# Patient Record
Sex: FEMALE | Race: WHITE | NOT HISPANIC OR LATINO | Employment: OTHER | URBAN - NONMETROPOLITAN AREA
[De-identification: names, ages, dates, MRNs, and addresses within clinical notes are randomized per-mention and may not be internally consistent; named-entity substitution may affect disease eponyms.]

---

## 2024-01-04 DIAGNOSIS — I10 ESSENTIAL HYPERTENSION: ICD-10-CM

## 2024-01-04 PROBLEM — R07.89 CHEST DISCOMFORT: Status: ACTIVE | Noted: 2024-01-04

## 2024-01-04 PROBLEM — R93.1 ECHOCARDIOGRAM ABNORMAL: Status: ACTIVE | Noted: 2024-01-04

## 2024-01-04 RX ORDER — ASPIRIN 81 MG/1
1 TABLET ORAL DAILY
COMMUNITY

## 2024-01-04 RX ORDER — AMLODIPINE BESYLATE 10 MG/1
10 TABLET ORAL DAILY
Qty: 90 TABLET | Refills: 3 | Status: SHIPPED | OUTPATIENT
Start: 2024-01-04

## 2024-01-04 RX ORDER — ATORVASTATIN CALCIUM 40 MG/1
40 TABLET, FILM COATED ORAL DAILY
COMMUNITY
Start: 2023-03-23

## 2024-01-04 RX ORDER — LOSARTAN POTASSIUM 50 MG/1
1 TABLET ORAL DAILY
COMMUNITY
Start: 2022-07-21

## 2024-01-04 RX ORDER — PANTOPRAZOLE SODIUM 40 MG/1
1 TABLET, DELAYED RELEASE ORAL 2 TIMES DAILY
COMMUNITY

## 2024-01-04 RX ORDER — SUCRALFATE 1 G/1
1 TABLET ORAL 2 TIMES DAILY
COMMUNITY

## 2024-01-04 RX ORDER — AMLODIPINE BESYLATE 10 MG/1
1 TABLET ORAL DAILY
COMMUNITY
End: 2024-04-10 | Stop reason: WASHOUT

## 2024-03-05 PROBLEM — Z78.9 NEVER SMOKED CIGARETTES: Status: ACTIVE | Noted: 2024-03-05

## 2024-03-05 PROBLEM — E78.5 HYPERLIPEMIA: Status: ACTIVE | Noted: 2024-03-05

## 2024-03-05 PROBLEM — R09.89 CAROTID BRUIT: Status: ACTIVE | Noted: 2024-03-05

## 2024-04-10 ENCOUNTER — OFFICE VISIT (OUTPATIENT)
Dept: CARDIOLOGY | Facility: CLINIC | Age: 89
End: 2024-04-10
Payer: MEDICARE

## 2024-04-10 VITALS
SYSTOLIC BLOOD PRESSURE: 148 MMHG | HEART RATE: 76 BPM | WEIGHT: 118 LBS | BODY MASS INDEX: 23.16 KG/M2 | HEIGHT: 60 IN | DIASTOLIC BLOOD PRESSURE: 68 MMHG

## 2024-04-10 DIAGNOSIS — R09.89 BRUIT OF LEFT CAROTID ARTERY: ICD-10-CM

## 2024-04-10 DIAGNOSIS — E78.2 MIXED HYPERLIPIDEMIA: ICD-10-CM

## 2024-04-10 DIAGNOSIS — I10 ESSENTIAL HYPERTENSION: Primary | ICD-10-CM

## 2024-04-10 PROCEDURE — 99214 OFFICE O/P EST MOD 30 MIN: CPT | Performed by: INTERNAL MEDICINE

## 2024-04-10 PROCEDURE — 1036F TOBACCO NON-USER: CPT | Performed by: INTERNAL MEDICINE

## 2024-04-10 PROCEDURE — 3077F SYST BP >= 140 MM HG: CPT | Performed by: INTERNAL MEDICINE

## 2024-04-10 PROCEDURE — 1159F MED LIST DOCD IN RCRD: CPT | Performed by: INTERNAL MEDICINE

## 2024-04-10 PROCEDURE — 3078F DIAST BP <80 MM HG: CPT | Performed by: INTERNAL MEDICINE

## 2024-04-10 RX ORDER — OXYBUTYNIN CHLORIDE 5 MG/1
5 TABLET, EXTENDED RELEASE ORAL DAILY
COMMUNITY
Start: 2024-01-13

## 2024-04-10 RX ORDER — MEMANTINE HYDROCHLORIDE 5 MG/1
5 TABLET ORAL DAILY
COMMUNITY
Start: 2024-01-13

## 2024-04-10 RX ORDER — MULTIVITAMIN
1 TABLET ORAL
COMMUNITY
Start: 2011-05-05

## 2024-04-10 NOTE — LETTER
April 10, 2024     Gerald Webb DO  [address] [facility] [address]  [facility] [address]    Patient: Mala Bernard   YOB: 1933   Date of Visit: 4/10/2024       Dear Dr. Gerald Webb DO:    Thank you for referring Mala Bernard to me for evaluation. Below are my notes for this consultation.  If you have questions, please do not hesitate to call me. I look forward to following your patient along with you.       Sincerely,     Sebas Zaidi MD      CC: No Recipients  ______________________________________________________________________________________    Subjective   Mala Bernard is a 89 y.o. female       Chief Complaint    Follow-up          HPI     Patient returns in follow-up of problems as noted.  She is done well.  I cannot elicit any angina CHF or arrhythmia symptomatology.  Blood pressure and lipids are adequately treated and are controlled.  Because of this no adjustments in therapy are made.  The patient states that from time to time she has chest heaviness.  Detailed questioning ensued.  She is physically active around the house and actually goes to the grocery store and pushes the cart.  With activity she has no discomfort but on rare occasion she will complain of chest heaviness at rest.  Symptoms in this regard do not sound anginal.  If in fact it was CAD I would expect symptoms with exertion and not at rest.  Because of this she and her son were provided education and reassurance.    In regards to her bruit it was evaluated last year and felt to be mild.  Because of this blood pressure control statin therapy and aspirin are provided and she has done well in this regard.    She and her son were educated regarding cardiac and neurologic symptoms to watch for and encouraged to call if they arise.    Vitals:    04/10/24 0904   BP: 148/68   BP Location: Left arm   Patient Position: Sitting   Pulse: 76   Weight: 53.5 kg (118 lb)   Height: 1.524 m (5')        Objective    Physical Exam  Constitutional:       Appearance: Normal appearance.   HENT:      Nose: Nose normal.   Neck:      Vascular: No carotid bruit.   Cardiovascular:      Rate and Rhythm: Normal rate.      Pulses: Normal pulses.      Heart sounds: Normal heart sounds.   Pulmonary:      Effort: Pulmonary effort is normal.   Abdominal:      General: Bowel sounds are normal.      Palpations: Abdomen is soft.   Musculoskeletal:         General: Normal range of motion.      Cervical back: Normal range of motion.      Right lower leg: No edema.      Left lower leg: No edema.   Skin:     General: Skin is warm and dry.   Neurological:      General: No focal deficit present.      Mental Status: She is alert.   Psychiatric:         Mood and Affect: Mood normal.         Behavior: Behavior normal.         Thought Content: Thought content normal.         Judgment: Judgment normal.         Allergies  Patient has no known allergies.     Current Medications    Current Outpatient Medications:   •  amLODIPine (Norvasc) 10 mg tablet, TAKE 1 TABLET BY MOUTH DAILY, Disp: 90 tablet, Rfl: 3  •  aspirin 81 mg EC tablet, Take 1 tablet (81 mg) by mouth once daily., Disp: , Rfl:   •  atorvastatin (Lipitor) 40 mg tablet, Take 1 tablet (40 mg) by mouth once daily., Disp: , Rfl:   •  losartan (Cozaar) 50 mg tablet, Take 1 tablet (50 mg) by mouth once daily., Disp: , Rfl:   •  memantine (Namenda) 5 mg tablet, Take 1 tablet (5 mg) by mouth once daily., Disp: , Rfl:   •  multivitamin tablet, Take 1 tablet by mouth once daily., Disp: , Rfl:   •  oxybutynin XL (Ditropan-XL) 5 mg 24 hr tablet, Take 1 tablet (5 mg) by mouth once daily., Disp: , Rfl:   •  pantoprazole (ProtoNix) 40 mg EC tablet, Take 1 tablet (40 mg) by mouth 2 times a day., Disp: , Rfl:   •  sucralfate (Carafate) 1 gram tablet, Take 1 tablet (1 g) by mouth 2 times a day., Disp: , Rfl:                      Assessment/Plan   1. Essential hypertension  Adequate control for someone 89 years  old.  No adjustments are recommended    2. Mixed hyperlipidemia  Patient is on Lipitor and review of recent labs demonstrate satisfactory control.    3. Bruit of left carotid artery  Carotid bruits felt to represent only mild disease based upon recent ultrasound.  No intervention necessary at this time.          Scribe Attestation  By signing my name below, I, Tima Montenegro LPN   attest that this documentation has been prepared under the direction and in the presence of Sebas Zaidi MD.     Provider Attestation - Scribe documentation    All medical record entries made by the Scribe were at my direction and personally dictated by me. I have reviewed the chart and agree that the record accurately reflects my personal performance of the history, physical exam, discussion and plan.

## 2024-04-10 NOTE — PATIENT INSTRUCTIONS
Please bring all medicines, vitamins, and herbal supplements with you when you come to the office.    Prescriptions will not be filled unless you are compliant with your follow up appointments or have a follow up appointment scheduled as per instruction of your physician. Refills should be requested at the time of your visit.     BMI was above normal measurement. Current weight: 53.5 kg (118 lb)  Weight change since last visit (-) denotes wt loss 10 lbs   Weight loss needed to achieve BMI 25: -9.7 Lbs  Weight loss needed to achieve BMI 30: -35.3 Lbs  Provided instructions on dietary changes  Provided instructions on exercise  Advised to Increase physical activity.

## 2024-04-10 NOTE — PROGRESS NOTES
Subjective   Mala Bernard is a 89 y.o. female       Chief Complaint    Follow-up          HPI     Patient returns in follow-up of problems as noted.  She is done well.  I cannot elicit any angina CHF or arrhythmia symptomatology.  Blood pressure and lipids are adequately treated and are controlled.  Because of this no adjustments in therapy are made.  The patient states that from time to time she has chest heaviness.  Detailed questioning ensued.  She is physically active around the house and actually goes to the grocery store and pushes the cart.  With activity she has no discomfort but on rare occasion she will complain of chest heaviness at rest.  Symptoms in this regard do not sound anginal.  If in fact it was CAD I would expect symptoms with exertion and not at rest.  Because of this she and her son were provided education and reassurance.    In regards to her bruit it was evaluated last year and felt to be mild.  Because of this blood pressure control statin therapy and aspirin are provided and she has done well in this regard.    She and her son were educated regarding cardiac and neurologic symptoms to watch for and encouraged to call if they arise.    Vitals:    04/10/24 0904   BP: 148/68   BP Location: Left arm   Patient Position: Sitting   Pulse: 76   Weight: 53.5 kg (118 lb)   Height: 1.524 m (5')        Objective   Physical Exam  Constitutional:       Appearance: Normal appearance.   HENT:      Nose: Nose normal.   Neck:      Vascular: No carotid bruit.   Cardiovascular:      Rate and Rhythm: Normal rate.      Pulses: Normal pulses.      Heart sounds: Normal heart sounds.   Pulmonary:      Effort: Pulmonary effort is normal.   Abdominal:      General: Bowel sounds are normal.      Palpations: Abdomen is soft.   Musculoskeletal:         General: Normal range of motion.      Cervical back: Normal range of motion.      Right lower leg: No edema.      Left lower leg: No edema.   Skin:     General:  Skin is warm and dry.   Neurological:      General: No focal deficit present.      Mental Status: She is alert.   Psychiatric:         Mood and Affect: Mood normal.         Behavior: Behavior normal.         Thought Content: Thought content normal.         Judgment: Judgment normal.         Allergies  Patient has no known allergies.     Current Medications    Current Outpatient Medications:     amLODIPine (Norvasc) 10 mg tablet, TAKE 1 TABLET BY MOUTH DAILY, Disp: 90 tablet, Rfl: 3    aspirin 81 mg EC tablet, Take 1 tablet (81 mg) by mouth once daily., Disp: , Rfl:     atorvastatin (Lipitor) 40 mg tablet, Take 1 tablet (40 mg) by mouth once daily., Disp: , Rfl:     losartan (Cozaar) 50 mg tablet, Take 1 tablet (50 mg) by mouth once daily., Disp: , Rfl:     memantine (Namenda) 5 mg tablet, Take 1 tablet (5 mg) by mouth once daily., Disp: , Rfl:     multivitamin tablet, Take 1 tablet by mouth once daily., Disp: , Rfl:     oxybutynin XL (Ditropan-XL) 5 mg 24 hr tablet, Take 1 tablet (5 mg) by mouth once daily., Disp: , Rfl:     pantoprazole (ProtoNix) 40 mg EC tablet, Take 1 tablet (40 mg) by mouth 2 times a day., Disp: , Rfl:     sucralfate (Carafate) 1 gram tablet, Take 1 tablet (1 g) by mouth 2 times a day., Disp: , Rfl:                      Assessment/Plan   1. Essential hypertension  Adequate control for someone 89 years old.  No adjustments are recommended    2. Mixed hyperlipidemia  Patient is on Lipitor and review of recent labs demonstrate satisfactory control.    3. Bruit of left carotid artery  Carotid bruits felt to represent only mild disease based upon recent ultrasound.  No intervention necessary at this time.          Scribe Attestation  By signing my name below, I, Denia HOLMAN LPN  , [place]   attest that this documentation has been prepared under the direction and in the presence of Sebas Zaidi MD.     Provider Attestation - Scribe documentation    All medical record entries made by the Scribe  were at my direction and personally dictated by me. I have reviewed the chart and agree that the record accurately reflects my personal performance of the history, physical exam, discussion and plan.

## 2024-06-14 ENCOUNTER — TELEPHONE (OUTPATIENT)
Dept: CARDIOLOGY | Facility: CLINIC | Age: 89
End: 2024-06-14
Payer: MEDICARE

## 2024-07-25 PROCEDURE — 93018 CV STRESS TEST I&R ONLY: CPT | Performed by: INTERNAL MEDICINE

## 2024-07-25 PROCEDURE — 78452 HT MUSCLE IMAGE SPECT MULT: CPT | Performed by: INTERNAL MEDICINE

## 2025-04-15 ENCOUNTER — APPOINTMENT (OUTPATIENT)
Dept: CARDIOLOGY | Facility: CLINIC | Age: OVER 89
End: 2025-04-15
Payer: MEDICARE